# Patient Record
Sex: FEMALE | Race: WHITE | Employment: FULL TIME | URBAN - NONMETROPOLITAN AREA
[De-identification: names, ages, dates, MRNs, and addresses within clinical notes are randomized per-mention and may not be internally consistent; named-entity substitution may affect disease eponyms.]

---

## 2023-11-14 ENCOUNTER — OFFICE VISIT (OUTPATIENT)
Dept: PRIMARY CARE | Facility: CLINIC | Age: 23
End: 2023-11-14
Payer: COMMERCIAL

## 2023-11-14 VITALS
WEIGHT: 235 LBS | SYSTOLIC BLOOD PRESSURE: 120 MMHG | HEART RATE: 142 BPM | OXYGEN SATURATION: 97 % | DIASTOLIC BLOOD PRESSURE: 80 MMHG | TEMPERATURE: 97 F

## 2023-11-14 DIAGNOSIS — F32.1 CURRENT MODERATE EPISODE OF MAJOR DEPRESSIVE DISORDER, UNSPECIFIED WHETHER RECURRENT (MULTI): Primary | ICD-10-CM

## 2023-11-14 PROBLEM — F90.9 ADHD (ATTENTION DEFICIT HYPERACTIVITY DISORDER): Status: ACTIVE | Noted: 2023-11-14

## 2023-11-14 PROCEDURE — 1036F TOBACCO NON-USER: CPT

## 2023-11-14 PROCEDURE — 99204 OFFICE O/P NEW MOD 45 MIN: CPT

## 2023-11-14 RX ORDER — BUPROPION HYDROCHLORIDE 150 MG/1
150 TABLET, EXTENDED RELEASE ORAL 2 TIMES DAILY
Qty: 180 TABLET | Refills: 0 | Status: SHIPPED | OUTPATIENT
Start: 2023-11-14 | End: 2024-02-05

## 2023-11-14 RX ORDER — BUSPIRONE HYDROCHLORIDE 5 MG/1
5 TABLET ORAL 2 TIMES DAILY PRN
Qty: 60 TABLET | Refills: 0 | Status: SHIPPED | OUTPATIENT
Start: 2023-11-14 | End: 2023-12-07

## 2023-11-14 RX ORDER — BUPROPION HYDROCHLORIDE 150 MG/1
TABLET, EXTENDED RELEASE ORAL 2 TIMES DAILY
COMMUNITY
End: 2023-11-14 | Stop reason: SDUPTHER

## 2023-11-14 ASSESSMENT — PATIENT HEALTH QUESTIONNAIRE - PHQ9
1. LITTLE INTEREST OR PLEASURE IN DOING THINGS: SEVERAL DAYS
2. FEELING DOWN, DEPRESSED OR HOPELESS: SEVERAL DAYS
SUM OF ALL RESPONSES TO PHQ9 QUESTIONS 1 & 2: 2

## 2023-11-14 ASSESSMENT — ENCOUNTER SYMPTOMS
LOSS OF SENSATION IN FEET: 0
OCCASIONAL FEELINGS OF UNSTEADINESS: 0
DEPRESSION: 0

## 2023-11-14 NOTE — PROGRESS NOTES
Subjective   Patient ID: Robyn Correa is a 23 y.o. female who presents for Establish Care (Robyn is here to establish care. Needs refills on medications. Medications is working ok but not great. ).  Subjective  Robyn Correa is a 23 y.o. female who presents for follow up of depression. Current symptoms include depressed mood, difficulty concentrating, and fatigue. Symptoms have been unchanged since that time. Patient denies weight loss. Previous treatment includes: medication. She complains of the following side effects from the treatment: none.    Objective  /80   Pulse (!) 142   Temp 36.1 °C (97 °F)   Wt 107 kg (235 lb)   SpO2 97%    General:  alert and oriented, in no acute distress  Affect & Behavior:  full facial expressions and good grooming, flattened affect    Assessment/Plan  Depression, gradually improving.  Medications: BuSpar and Wellbutrin.  Added buspar for augmenting        Vitals:    11/14/23 0929   BP: 120/80   Pulse: (!) 142   Temp: 36.1 °C (97 °F)   SpO2: 97%       Review of Systems    Objective   Physical Exam    Assessment/Plan   Problem List Items Addressed This Visit    None  Visit Diagnoses       Current moderate episode of major depressive disorder, unspecified whether recurrent (CMS/[facility])    -  Primary    Relevant Medications    busPIRone (Buspar) 5 mg tablet    buPROPion SR (Wellbutrin SR) 150 mg 12 hr tablet                 Thank you for coming in today, please call my office if you have any concerns or questions.     Forest NAVARRO, CNP

## 2023-11-14 NOTE — PATIENT INSTRUCTIONS
See back in 1 month for follow up on depression, anytime sooner if needed  We are augmenting your anti-depressant with buspirone  Potential side effects include nausea or fatigue    Thank you for coming in today, if any questions or concerns arise, please call my office.   Forest Bradley, MELANIE-CNP

## 2023-12-07 DIAGNOSIS — F32.1 CURRENT MODERATE EPISODE OF MAJOR DEPRESSIVE DISORDER, UNSPECIFIED WHETHER RECURRENT (MULTI): ICD-10-CM

## 2023-12-07 RX ORDER — BUSPIRONE HYDROCHLORIDE 5 MG/1
5 TABLET ORAL 2 TIMES DAILY PRN
Qty: 180 TABLET | Refills: 1 | Status: SHIPPED | OUTPATIENT
Start: 2023-12-07 | End: 2024-05-29

## 2023-12-14 ENCOUNTER — OFFICE VISIT (OUTPATIENT)
Dept: PRIMARY CARE | Facility: CLINIC | Age: 23
End: 2023-12-14
Payer: COMMERCIAL

## 2023-12-14 VITALS
HEART RATE: 86 BPM | DIASTOLIC BLOOD PRESSURE: 88 MMHG | OXYGEN SATURATION: 98 % | SYSTOLIC BLOOD PRESSURE: 120 MMHG | WEIGHT: 228 LBS | TEMPERATURE: 97.6 F

## 2023-12-14 DIAGNOSIS — F32.1 CURRENT MODERATE EPISODE OF MAJOR DEPRESSIVE DISORDER, UNSPECIFIED WHETHER RECURRENT (MULTI): Primary | ICD-10-CM

## 2023-12-14 PROCEDURE — 99214 OFFICE O/P EST MOD 30 MIN: CPT

## 2023-12-14 PROCEDURE — 1036F TOBACCO NON-USER: CPT

## 2023-12-14 ASSESSMENT — ENCOUNTER SYMPTOMS: SLEEP DISTURBANCE: 1

## 2023-12-14 NOTE — PATIENT INSTRUCTIONS
Sleep hygiene:     No phone use up to 2 hours before bed  Can try Melatonin 5-10mg at night about 1 hour prior to bedtime  Try not eating up to 1-2 hours prior to bedtime  Wind down with yoga or a good book    Follow up on mood and sleep in 3 months    Thank you for coming in today, if any questions or concerns arise, please call my office.   Forest Bradley, APRN-CNP

## 2023-12-14 NOTE — PROGRESS NOTES
Subjective   Patient ID: Robyn Correa is a 23 y.o. female who presents for Follow-up (Robyn is here for a mood follow up. She has been doing better with her mood. Dosing is starting work however however she is unable to sleep more then 5 hours. ).  Subjective  Robyn Correa is a 23 y.o. female who presents for follow up of anxiety disorder. Current symptoms: insomnia. She denies current suicidal and homicidal ideation. She complains of the following side effects from the treatment: none.     Objective  /88   Pulse 86   Temp 36.4 °C (97.6 °F)   Wt 103 kg (228 lb)   SpO2 98%    General:    alert and oriented, in no acute distress  Affect/Behavior:     full facial expressions, good grooming, and good insight     Assessment/Plan  Anxiety Disorder - improving  Instructed patient to contact office or on-call physician promptly should condition worsen or any new symptoms appear and provided on-call telephone numbers. IF THE PATIENT HAS ANY SUICIDAL OR HOMICIDAL IDEATIONS, CALL THE OFFICE, DISCUSS WITH A SUPPORT MEMBER, OR GO TO THE ER IMMEDIATELY. Patient was agreeable with this plan.  Follow up: 6 month.  Spent 20 minutes (>50% of visit) discussing the risks of anxiety disorder and sleep disturbance, the  pathophysiology, etiology, risks, and principles of treatment.          Vitals:    12/14/23 0914   BP: 120/88   Pulse: 86   Temp: 36.4 °C (97.6 °F)   SpO2: 98%       Review of Systems   Psychiatric/Behavioral:  Positive for sleep disturbance.        Objective   Physical Exam  Vitals and nursing note reviewed.         Assessment/Plan   Problem List Items Addressed This Visit    None  Visit Diagnoses       Current moderate episode of major depressive disorder, unspecified whether recurrent (CMS/[facility])    -  Primary                 Thank you for coming in today, please call my office if you have any concerns or questions.     Forest NAVARRO, CNP

## 2024-02-04 DIAGNOSIS — F32.1 CURRENT MODERATE EPISODE OF MAJOR DEPRESSIVE DISORDER, UNSPECIFIED WHETHER RECURRENT (MULTI): ICD-10-CM

## 2024-02-05 RX ORDER — BUPROPION HYDROCHLORIDE 150 MG/1
150 TABLET, EXTENDED RELEASE ORAL 2 TIMES DAILY
Qty: 180 TABLET | Refills: 0 | Status: SHIPPED | OUTPATIENT
Start: 2024-02-05 | End: 2024-03-14 | Stop reason: SDUPTHER

## 2024-03-14 ENCOUNTER — OFFICE VISIT (OUTPATIENT)
Dept: PRIMARY CARE | Facility: CLINIC | Age: 24
End: 2024-03-14
Payer: COMMERCIAL

## 2024-03-14 VITALS
OXYGEN SATURATION: 99 % | TEMPERATURE: 96.6 F | DIASTOLIC BLOOD PRESSURE: 80 MMHG | SYSTOLIC BLOOD PRESSURE: 130 MMHG | HEART RATE: 143 BPM | WEIGHT: 220 LBS

## 2024-03-14 DIAGNOSIS — F32.1 CURRENT MODERATE EPISODE OF MAJOR DEPRESSIVE DISORDER, UNSPECIFIED WHETHER RECURRENT (MULTI): Primary | ICD-10-CM

## 2024-03-14 PROCEDURE — 99213 OFFICE O/P EST LOW 20 MIN: CPT

## 2024-03-14 PROCEDURE — 1036F TOBACCO NON-USER: CPT

## 2024-03-14 RX ORDER — BUPROPION HYDROCHLORIDE 150 MG/1
150 TABLET, EXTENDED RELEASE ORAL 2 TIMES DAILY
Qty: 180 TABLET | Refills: 1 | Status: SHIPPED | OUTPATIENT
Start: 2024-03-14 | End: 2024-09-10

## 2024-03-14 NOTE — PROGRESS NOTES
Subjective   Patient ID: Robyn Correa is a 23 y.o. female who presents for Follow-up (Robyn is here for a follow up. ).  Subjective  Robyn Correa is a 23 y.o. female who presents for follow up of anxiety disorder. Current symptoms: none. She denies current suicidal and homicidal ideation. She complains of the following side effects from the treatment: none.     Objective  /80   Pulse (!) 143   Temp 35.9 °C (96.6 °F)   Wt 99.8 kg (220 lb)   SpO2 99%    General:    alert and oriented, in no acute distress  Affect/Behavior:     full facial expressions, good grooming, and good insight     Assessment/Plan  Anxiety Disorder - improving  Reviewed concept of anxiety as biochemical imbalance of neurotransmitters and rationale for treatment.  Instructed patient to contact office or on-call physician promptly should condition worsen or any new symptoms appear and provided on-call telephone numbers. IF THE PATIENT HAS ANY SUICIDAL OR HOMICIDAL IDEATIONS, CALL THE OFFICE, DISCUSS WITH A SUPPORT MEMBER, OR GO TO THE ER IMMEDIATELY. Patient was agreeable with this plan.          Vitals:    03/14/24 0906   BP: 130/80   Pulse: (!) 143   Temp: 35.9 °C (96.6 °F)   SpO2: 99%       Review of Systems    Objective   Physical Exam    Assessment/Plan   Problem List Items Addressed This Visit    None  Visit Diagnoses       Current moderate episode of major depressive disorder, unspecified whether recurrent (CMS/H[facility])    -  Primary    Relevant Medications    buPROPion SR (Wellbutrin SR) 150 mg 12 hr tablet                 Thank you for coming in today, please call my office if you have any concerns or questions.     Forest NAVARRO, CNP

## 2024-03-14 NOTE — PATIENT INSTRUCTIONS
Continue medication current  See in 6 months for follow up, at least twice yearly when stable    Anytime sooner when needed.    Thank you for coming in today, if any questions or concerns arise, please call my office.   MELANIE Betancourt-CNP

## 2024-05-29 DIAGNOSIS — F32.1 CURRENT MODERATE EPISODE OF MAJOR DEPRESSIVE DISORDER, UNSPECIFIED WHETHER RECURRENT (MULTI): ICD-10-CM

## 2024-05-29 RX ORDER — BUSPIRONE HYDROCHLORIDE 5 MG/1
5 TABLET ORAL 2 TIMES DAILY PRN
Qty: 180 TABLET | Refills: 1 | Status: SHIPPED | OUTPATIENT
Start: 2024-05-29 | End: 2024-11-25

## 2024-09-12 ENCOUNTER — APPOINTMENT (OUTPATIENT)
Dept: PRIMARY CARE | Facility: CLINIC | Age: 24
End: 2024-09-12
Payer: COMMERCIAL